# Patient Record
Sex: MALE | Race: WHITE | NOT HISPANIC OR LATINO | Employment: OTHER | URBAN - METROPOLITAN AREA
[De-identification: names, ages, dates, MRNs, and addresses within clinical notes are randomized per-mention and may not be internally consistent; named-entity substitution may affect disease eponyms.]

---

## 2017-12-28 ENCOUNTER — ESTABLISHED COMPREHENSIVE EXAM (OUTPATIENT)
Dept: URBAN - METROPOLITAN AREA CLINIC 43 | Facility: CLINIC | Age: 67
End: 2017-12-28

## 2017-12-28 DIAGNOSIS — H43.813: ICD-10-CM

## 2017-12-28 DIAGNOSIS — H25.13: ICD-10-CM

## 2017-12-28 PROCEDURE — 92015 DETERMINE REFRACTIVE STATE: CPT

## 2017-12-28 PROCEDURE — 92014 COMPRE OPH EXAM EST PT 1/>: CPT

## 2017-12-28 ASSESSMENT — VISUAL ACUITY
OD_SC: 20/30+1
OS_SC: J12
OD_SC: J12
OS_SC: 20/25-1

## 2017-12-28 ASSESSMENT — TONOMETRY
OS_IOP_MMHG: 12
OD_IOP_MMHG: 11

## 2019-05-31 ENCOUNTER — ESTABLISHED COMPREHENSIVE EXAM (OUTPATIENT)
Dept: URBAN - METROPOLITAN AREA CLINIC 43 | Facility: CLINIC | Age: 69
End: 2019-05-31

## 2019-05-31 DIAGNOSIS — H25.13: ICD-10-CM

## 2019-05-31 DIAGNOSIS — H43.813: ICD-10-CM

## 2019-05-31 PROCEDURE — 92015 DETERMINE REFRACTIVE STATE: CPT

## 2019-05-31 PROCEDURE — 92014 COMPRE OPH EXAM EST PT 1/>: CPT

## 2019-05-31 ASSESSMENT — VISUAL ACUITY
OD_SC: 20/50+1
OS_CC: J1
OS_SC: 20/30
OS_SC: J10
OS_BAT: 20/200
OD_BAT: 20/200
OD_SC: J10
OD_CC: J4

## 2019-05-31 ASSESSMENT — TONOMETRY
OS_IOP_MMHG: 16
OD_IOP_MMHG: 16

## 2020-05-22 NOTE — PATIENT DISCUSSION
[facility] Visual Field 36 point screen: I have reviewed the visual fields both taped and untaped on this patient which demonstrate significant obstruction of the patient's peripheral visual field on both eyes.

## 2020-06-04 ENCOUNTER — ESTABLISHED COMPREHENSIVE EXAM (OUTPATIENT)
Dept: URBAN - METROPOLITAN AREA CLINIC 43 | Facility: CLINIC | Age: 70
End: 2020-06-04

## 2020-06-04 DIAGNOSIS — H25.13: ICD-10-CM

## 2020-06-04 DIAGNOSIS — H43.813: ICD-10-CM

## 2020-06-04 DIAGNOSIS — H35.3131: ICD-10-CM

## 2020-06-04 PROCEDURE — 92014 COMPRE OPH EXAM EST PT 1/>: CPT

## 2020-06-04 PROCEDURE — 92015 DETERMINE REFRACTIVE STATE: CPT

## 2020-06-04 ASSESSMENT — VISUAL ACUITY
OD_CC: J3
OD_SC: J12
OS_SC: J12
OS_CC: J2
OD_SC: 20/50+2
OS_SC: 20/25-1

## 2020-06-04 ASSESSMENT — TONOMETRY
OS_IOP_MMHG: 10
OD_IOP_MMHG: 13

## 2020-07-16 NOTE — PATIENT DISCUSSION
All sutures removed without difficulty today. Patient is continuing to heal well following surgery. Reviewed skin care and sun avoidance. Sunscreen given. Follow up in one month, sooner for problems.

## 2020-08-28 NOTE — PATIENT DISCUSSION
Patient is continuing to heal well following surgery. Skin care reviewed today. Eye cream given. Discussed a vial of Voluma to the cheeks and Juvederm Ultra. Follow up prn.

## 2020-08-28 NOTE — PATIENT DISCUSSION
BOTOX for COSMETIC (10 units): The patient presented with multiple facial rhytids after informed consent the patient was treated with Botox at a dosage documented on [employer] in this chart. The patient tolerated the procedure well.

## 2021-11-30 NOTE — PROCEDURE NOTE: CLINICAL
PROCEDURE NOTE: Eyelid Removal of FB Right Upper Lid. Diagnosis: Retained Foreign Body of Eyelid. After informed signed consent, patient was anesthetized with local lidocaine with epi to the right upper eyelid. Sterile toothed forceps and jewelers were used to removed extruded suture. The patient handled the procedure well, gave written post op instructions and an rx for erythromycin ophthalmic ointment. Judson Duque

## 2021-11-30 NOTE — PATIENT DISCUSSION
Discussed r/b of a procedure to remove the suture today.  Patient expressed understanding and wishes to proceed.  The suture was removed without difficulty.

## 2022-07-06 ENCOUNTER — COMPREHENSIVE EXAM (OUTPATIENT)
Dept: URBAN - METROPOLITAN AREA CLINIC 47 | Facility: CLINIC | Age: 72
End: 2022-07-06

## 2022-07-06 DIAGNOSIS — H52.7: ICD-10-CM

## 2022-07-06 DIAGNOSIS — H35.3131: ICD-10-CM

## 2022-07-06 DIAGNOSIS — H43.813: ICD-10-CM

## 2022-07-06 DIAGNOSIS — H25.813: ICD-10-CM

## 2022-07-06 PROCEDURE — 92014 COMPRE OPH EXAM EST PT 1/>: CPT

## 2022-07-06 PROCEDURE — 92015 DETERMINE REFRACTIVE STATE: CPT

## 2022-07-06 ASSESSMENT — VISUAL ACUITY
OD_CC: J3
OS_SC: 20/40
OS_BAT: 20/150
OD_SC: 20/40
OD_BAT: 20/50 W/ MR
OU_SC: 20/40
OU_CC: J2
OS_CC: J2

## 2022-07-06 ASSESSMENT — TONOMETRY
OS_IOP_MMHG: 16
OD_IOP_MMHG: 16

## 2022-07-13 ENCOUNTER — CONSULTATION/EVALUATION (OUTPATIENT)
Dept: URBAN - METROPOLITAN AREA CLINIC 43 | Facility: CLINIC | Age: 72
End: 2022-07-13

## 2022-07-13 DIAGNOSIS — H35.3131: ICD-10-CM

## 2022-07-13 DIAGNOSIS — H43.813: ICD-10-CM

## 2022-07-13 DIAGNOSIS — H04.123: ICD-10-CM

## 2022-07-13 DIAGNOSIS — H25.813: ICD-10-CM

## 2022-07-13 DIAGNOSIS — H18.511: ICD-10-CM

## 2022-07-13 PROCEDURE — 99214 OFFICE O/P EST MOD 30 MIN: CPT

## 2022-07-13 PROCEDURE — 92134 CPTRZ OPH DX IMG PST SGM RTA: CPT

## 2022-07-13 PROCEDURE — 92025-2 CORNEAL TOPOGRAPHY, PT

## 2022-07-13 PROCEDURE — 92136TC INTERFEROMETRY - TECHNICAL COMPONENT

## 2022-07-13 PROCEDURE — V2799CY CYCLOSPORINE 0.1% - KLARITY C

## 2022-07-13 PROCEDURE — 92250 FUNDUS PHOTOGRAPHY W/I&R: CPT

## 2022-07-13 PROCEDURE — 92286 ANT SGM IMG I&R SPECLR MIC: CPT

## 2022-07-13 PROCEDURE — V2799PMN IMPRIMIS PRED-MOXI-NEPAF 5ML

## 2022-07-13 ASSESSMENT — VISUAL ACUITY
OS_SC: J12
OS_BAT: 20/100
OD_SC: 20/30-2
OD_BAT: 20/100
OS_SC: 20/30+1
OD_CC: J2
OD_SC: J12
OS_CC: J2

## 2022-07-13 ASSESSMENT — TONOMETRY
OD_IOP_MMHG: 15
OS_IOP_MMHG: 12

## 2022-08-18 ENCOUNTER — SURGERY/PROCEDURE (OUTPATIENT)
Dept: URBAN - METROPOLITAN AREA CLINIC 43 | Facility: CLINIC | Age: 72
End: 2022-08-18

## 2022-08-18 ENCOUNTER — PRE-OP/H&P (OUTPATIENT)
Dept: URBAN - METROPOLITAN AREA SURGERY 14 | Facility: SURGERY | Age: 72
End: 2022-08-18

## 2022-08-18 DIAGNOSIS — H21.81: ICD-10-CM

## 2022-08-18 DIAGNOSIS — H35.3131: ICD-10-CM

## 2022-08-18 DIAGNOSIS — H04.123: ICD-10-CM

## 2022-08-18 DIAGNOSIS — H25.813: ICD-10-CM

## 2022-08-18 DIAGNOSIS — H43.813: ICD-10-CM

## 2022-08-18 DIAGNOSIS — H18.511: ICD-10-CM

## 2022-08-18 DIAGNOSIS — H25.812: ICD-10-CM

## 2022-08-18 PROCEDURE — 66984CV REMOVE CATARACT, INSERT LENS, CUSTOM VISION

## 2022-08-18 PROCEDURE — 66999LNSR LENSAR LASER FOR CAT SX

## 2022-08-18 PROCEDURE — 65772LRI LRI DURING CAT SX

## 2022-08-18 PROCEDURE — 99211HP H&P OFFICE/OUTPATIENT VISIT, EST

## 2022-08-19 ENCOUNTER — POST-OP (OUTPATIENT)
Dept: URBAN - METROPOLITAN AREA CLINIC 47 | Facility: CLINIC | Age: 72
End: 2022-08-19

## 2022-08-19 DIAGNOSIS — Z96.1: ICD-10-CM

## 2022-08-19 PROCEDURE — 99199RSD RESIDENT SHADOWING PROVIDER

## 2022-08-19 ASSESSMENT — TONOMETRY
OS_IOP_MMHG: 14
OD_IOP_MMHG: 13

## 2022-08-19 ASSESSMENT — VISUAL ACUITY
OS_SC: 20/30
OD_SC: 20/30-1

## 2022-08-23 ENCOUNTER — POST OP/EVAL OF SECOND EYE (OUTPATIENT)
Dept: URBAN - METROPOLITAN AREA CLINIC 47 | Facility: CLINIC | Age: 72
End: 2022-08-23

## 2022-08-23 DIAGNOSIS — H04.123: ICD-10-CM

## 2022-08-23 DIAGNOSIS — H25.811: ICD-10-CM

## 2022-08-23 DIAGNOSIS — H18.511: ICD-10-CM

## 2022-08-23 DIAGNOSIS — Z96.1: ICD-10-CM

## 2022-08-23 PROCEDURE — 99024 POSTOP FOLLOW-UP VISIT: CPT

## 2022-08-23 PROCEDURE — 99213 OFFICE O/P EST LOW 20 MIN: CPT

## 2022-08-23 ASSESSMENT — TONOMETRY
OD_IOP_MMHG: 14
OS_IOP_MMHG: 14

## 2022-08-23 ASSESSMENT — VISUAL ACUITY
OS_SC: 20/20
OD_SC: 20/40

## 2022-08-25 ENCOUNTER — SURGERY/PROCEDURE (OUTPATIENT)
Dept: URBAN - METROPOLITAN AREA CLINIC 43 | Facility: CLINIC | Age: 72
End: 2022-08-25

## 2022-08-25 DIAGNOSIS — H25.811: ICD-10-CM

## 2022-08-25 PROCEDURE — 66984CV REMOVE CATARACT, INSERT LENS, CUSTOM VISION

## 2022-08-25 PROCEDURE — 65772LRI LRI DURING CAT SX

## 2022-08-25 PROCEDURE — 66999LNSR LENSAR LASER FOR CAT SX

## 2022-08-26 ENCOUNTER — POST-OP (OUTPATIENT)
Dept: URBAN - METROPOLITAN AREA CLINIC 47 | Facility: CLINIC | Age: 72
End: 2022-08-26

## 2022-08-26 DIAGNOSIS — Z96.1: ICD-10-CM

## 2022-08-26 DIAGNOSIS — H18.511: ICD-10-CM

## 2022-08-26 DIAGNOSIS — H04.123: ICD-10-CM

## 2022-08-26 PROCEDURE — 99024 POSTOP FOLLOW-UP VISIT: CPT

## 2022-08-26 ASSESSMENT — VISUAL ACUITY
OS_SC: 20/20
OD_SC: 20/25-1

## 2022-08-26 ASSESSMENT — TONOMETRY
OD_IOP_MMHG: 16
OS_IOP_MMHG: 16

## 2022-09-19 ENCOUNTER — POST-OP (OUTPATIENT)
Dept: URBAN - METROPOLITAN AREA CLINIC 47 | Facility: CLINIC | Age: 72
End: 2022-09-19

## 2022-09-19 DIAGNOSIS — H04.123: ICD-10-CM

## 2022-09-19 DIAGNOSIS — H02.88B: ICD-10-CM

## 2022-09-19 DIAGNOSIS — Z96.1: ICD-10-CM

## 2022-09-19 DIAGNOSIS — H02.88A: ICD-10-CM

## 2022-09-19 PROCEDURE — 99024 POSTOP FOLLOW-UP VISIT: CPT

## 2022-09-19 ASSESSMENT — TONOMETRY
OD_IOP_MMHG: 16
OS_IOP_MMHG: 14

## 2022-09-19 ASSESSMENT — VISUAL ACUITY
OU_SC: 20/20
OS_SC: 20/20-2
OD_SC: 20/25+1

## 2023-01-23 ENCOUNTER — FOLLOW UP (OUTPATIENT)
Dept: URBAN - METROPOLITAN AREA CLINIC 47 | Facility: CLINIC | Age: 73
End: 2023-01-23

## 2023-01-23 DIAGNOSIS — H04.123: ICD-10-CM

## 2023-01-23 DIAGNOSIS — Z96.1: ICD-10-CM

## 2023-01-23 DIAGNOSIS — H02.88B: ICD-10-CM

## 2023-01-23 DIAGNOSIS — H26.493: ICD-10-CM

## 2023-01-23 DIAGNOSIS — H02.88A: ICD-10-CM

## 2023-01-23 PROCEDURE — 68761C PUNCTUM PLUG /COLLAGEN, EACH

## 2023-01-23 PROCEDURE — A4262 TEMPORARY TEAR DUCT PLUG: HCPCS

## 2023-01-23 PROCEDURE — 99213 OFFICE O/P EST LOW 20 MIN: CPT

## 2023-01-23 ASSESSMENT — TONOMETRY
OD_IOP_MMHG: 14
OS_IOP_MMHG: 12

## 2023-01-23 ASSESSMENT — VISUAL ACUITY
OD_SC: J10
OS_SC: J7
OS_BAT: 20/25
OD_SC: 20/20-2
OD_BAT: 20/30
OS_SC: 20/20

## 2024-03-20 ENCOUNTER — COMPREHENSIVE EXAM (OUTPATIENT)
Dept: URBAN - METROPOLITAN AREA CLINIC 47 | Facility: CLINIC | Age: 74
End: 2024-03-20

## 2024-03-20 DIAGNOSIS — H02.88A: ICD-10-CM

## 2024-03-20 DIAGNOSIS — H52.7: ICD-10-CM

## 2024-03-20 DIAGNOSIS — H02.88B: ICD-10-CM

## 2024-03-20 DIAGNOSIS — H04.123: ICD-10-CM

## 2024-03-20 DIAGNOSIS — H35.3131: ICD-10-CM

## 2024-03-20 DIAGNOSIS — H43.813: ICD-10-CM

## 2024-03-20 DIAGNOSIS — Z96.1: ICD-10-CM

## 2024-03-20 PROCEDURE — 92015 DETERMINE REFRACTIVE STATE: CPT

## 2024-03-20 PROCEDURE — 99214 OFFICE O/P EST MOD 30 MIN: CPT

## 2024-03-20 PROCEDURE — 68761C PUNCTUM PLUG /COLLAGEN, EACH

## 2024-03-20 PROCEDURE — A4262 TEMPORARY TEAR DUCT PLUG: HCPCS

## 2024-03-20 RX ORDER — CYCLOSPORINE 0.5 MG/ML: 1 EMULSION OPHTHALMIC TWICE A DAY

## 2024-03-20 ASSESSMENT — VISUAL ACUITY
OS_SC: 20/20-1
OU_SC: 20/20
OD_SC: J12
OU_SC: J8
OD_SC: 20/25+1
OS_SC: J10

## 2024-03-20 ASSESSMENT — TONOMETRY
OD_IOP_MMHG: 16
OS_IOP_MMHG: 14

## 2024-11-05 ENCOUNTER — FOLLOW UP (OUTPATIENT)
Dept: URBAN - METROPOLITAN AREA CLINIC 47 | Facility: CLINIC | Age: 74
End: 2024-11-05

## 2024-11-05 DIAGNOSIS — H02.88B: ICD-10-CM

## 2024-11-05 DIAGNOSIS — H04.123: ICD-10-CM

## 2024-11-05 DIAGNOSIS — H04.223: ICD-10-CM

## 2024-11-05 DIAGNOSIS — H02.88A: ICD-10-CM

## 2024-11-05 PROCEDURE — 99213 OFFICE O/P EST LOW 20 MIN: CPT | Mod: 25

## 2024-11-05 PROCEDURE — 68840 EXPLORE/IRRIGATE TEAR DUCTS: CPT | Mod: E2,E4

## 2025-05-28 ENCOUNTER — COMPREHENSIVE EXAM (OUTPATIENT)
Age: 75
End: 2025-05-28

## 2025-05-28 DIAGNOSIS — H43.813: ICD-10-CM

## 2025-05-28 DIAGNOSIS — H02.88A: ICD-10-CM

## 2025-05-28 DIAGNOSIS — L71.9: ICD-10-CM

## 2025-05-28 DIAGNOSIS — H04.213: ICD-10-CM

## 2025-05-28 DIAGNOSIS — Z96.1: ICD-10-CM

## 2025-05-28 DIAGNOSIS — H04.123: ICD-10-CM

## 2025-05-28 DIAGNOSIS — H02.88B: ICD-10-CM

## 2025-05-28 PROCEDURE — 99214 OFFICE O/P EST MOD 30 MIN: CPT

## 2025-05-28 RX ORDER — PERFLUOROHEXYLOCTANE 1 MG/MG: 1 SOLUTION OPHTHALMIC
